# Patient Record
Sex: MALE | Race: WHITE | NOT HISPANIC OR LATINO | Employment: FULL TIME | URBAN - METROPOLITAN AREA
[De-identification: names, ages, dates, MRNs, and addresses within clinical notes are randomized per-mention and may not be internally consistent; named-entity substitution may affect disease eponyms.]

---

## 2017-05-15 ENCOUNTER — HOSPITAL ENCOUNTER (EMERGENCY)
Facility: HOSPITAL | Age: 42
Discharge: HOME | End: 2017-05-15
Attending: EMERGENCY MEDICINE

## 2017-05-15 VITALS
BODY MASS INDEX: 25.2 KG/M2 | HEART RATE: 66 BPM | SYSTOLIC BLOOD PRESSURE: 143 MMHG | OXYGEN SATURATION: 95 % | RESPIRATION RATE: 20 BRPM | TEMPERATURE: 98 F | HEIGHT: 71 IN | DIASTOLIC BLOOD PRESSURE: 78 MMHG | WEIGHT: 180 LBS

## 2017-05-15 DIAGNOSIS — K64.5 THROMBOSED HEMORRHOIDS: Primary | ICD-10-CM

## 2017-05-15 PROCEDURE — 25000003 PHARM REV CODE 250

## 2017-05-15 PROCEDURE — 25000003 PHARM REV CODE 250: Performed by: EMERGENCY MEDICINE

## 2017-05-15 PROCEDURE — 46320 REMOVAL OF HEMORRHOID CLOT: CPT

## 2017-05-15 PROCEDURE — 96372 THER/PROPH/DIAG INJ SC/IM: CPT

## 2017-05-15 PROCEDURE — 99283 EMERGENCY DEPT VISIT LOW MDM: CPT | Mod: 25

## 2017-05-15 PROCEDURE — 46083 INC THROMBOSED HROID XTRNL: CPT

## 2017-05-15 PROCEDURE — 63600175 PHARM REV CODE 636 W HCPCS: Performed by: EMERGENCY MEDICINE

## 2017-05-15 RX ORDER — HYDROMORPHONE HYDROCHLORIDE 1 MG/ML
1 INJECTION, SOLUTION INTRAMUSCULAR; INTRAVENOUS; SUBCUTANEOUS
Status: COMPLETED | OUTPATIENT
Start: 2017-05-15 | End: 2017-05-15

## 2017-05-15 RX ORDER — OXYCODONE AND ACETAMINOPHEN 5; 325 MG/1; MG/1
1 TABLET ORAL EVERY 4 HOURS PRN
Qty: 15 TABLET | Refills: 0 | Status: SHIPPED | OUTPATIENT
Start: 2017-05-15 | End: 2020-08-28 | Stop reason: CLARIF

## 2017-05-15 RX ORDER — ALPRAZOLAM 0.25 MG/1
0.5 TABLET ORAL
Status: COMPLETED | OUTPATIENT
Start: 2017-05-15 | End: 2017-05-15

## 2017-05-15 RX ORDER — DOCUSATE SODIUM 100 MG/1
100 CAPSULE, LIQUID FILLED ORAL 2 TIMES DAILY PRN
Qty: 30 CAPSULE | Refills: 0 | Status: SHIPPED | OUTPATIENT
Start: 2017-05-15 | End: 2020-08-28 | Stop reason: CLARIF

## 2017-05-15 RX ORDER — LIDOCAINE HYDROCHLORIDE AND EPINEPHRINE 10; 10 MG/ML; UG/ML
10 INJECTION, SOLUTION INFILTRATION; PERINEURAL
Status: DISCONTINUED | OUTPATIENT
Start: 2017-05-15 | End: 2017-05-16 | Stop reason: HOSPADM

## 2017-05-15 RX ADMIN — ALPRAZOLAM 0.5 MG: 0.25 TABLET ORAL at 09:05

## 2017-05-15 RX ADMIN — HYDROMORPHONE HYDROCHLORIDE 1 MG: 1 INJECTION, SOLUTION INTRAMUSCULAR; INTRAVENOUS; SUBCUTANEOUS at 09:05

## 2017-05-15 RX ADMIN — LIDOCAINE HYDROCHLORIDE: 10; .005 INJECTION, SOLUTION EPIDURAL; INFILTRATION; INTRACAUDAL; PERINEURAL at 09:05

## 2017-05-15 NOTE — ED AVS SNAPSHOT
[facility] [address] [place] [address]               Karthik Escobar   5/15/2017  8:54 PM   ED    Description:  Male : 1975   Department:  [facility]           Your Care was Coordinated By:     Provider Role From To    Yoshi Blank MD Attending Provider 05/15/17 9212 --      Reason for Visit     Hemorrhoids           Diagnoses this Visit        Comments    Thrombosed hemorrhoids    -  Primary       ED Disposition     None           To Do List           Follow-up Information     Follow up with [facility].    Specialty:  Emergency Medicine    Why:  As needed    Contact information:    [address] [place] [address] [id number] [phone]        Schedule an appointment as soon as possible for a visit with Jaguar Banda MD.    Specialties:  General Surgery, Colon and Rectal Surgery, Surgery    Contact information:     [facility]  [address]  [facility] [address]  [phone]         These Medications        Disp Refills Start End    docusate sodium (COLACE) 100 MG capsule 30 capsule 0 5/15/2017     Take 1 capsule (100 mg total) by mouth 2 (two) times daily as needed for Constipation. - Oral    oxycodone-acetaminophen (PERCOCET) 5-325 mg per tablet 15 tablet 0 5/15/2017     Take 1 tablet by mouth every 4 (four) hours as needed. - Oral      [facility]     [facility] Care Line - 24/7 Assistance  Unless otherwise directed by your provider, please contact [facility], our nurse care line that is available for 24/7 assistance.     Registered nurses in the [facility] provide: appointment scheduling, clinical advisement, health education, and other advisory services.  Call: [phone] (toll free)               Medications           Message regarding Medications     Verify the changes and/or additions to your medication regime listed below are the same as  discussed with your clinician today.  If any of these changes or additions are incorrect, please notify your healthcare provider.        START taking these NEW medications        Refills    docusate sodium (COLACE) 100 MG capsule 0    Sig: Take 1 capsule (100 mg total) by mouth 2 (two) times daily as needed for Constipation.    Class: Print    Route: Oral    oxycodone-acetaminophen (PERCOCET) 5-325 mg per tablet 0    Sig: Take 1 tablet by mouth every 4 (four) hours as needed.    Class: Print    Route: Oral      These medications were administered today        Dose Freq    lidocaine-EPINEPHrine 1%-1:100,000 injection 10 mL 10 mL ED 1 Time    Sig: Inject 10 mLs into the skin ED 1 Time.    Class: Normal    Route: Intradermal    HYDROmorphone injection 1 mg 1 mg ED 1 Time    Sig: Inject 1 mL (1 mg total) into the muscle ED 1 Time.    Class: Normal    Route: Intramuscular    lidocaine-EPINEPHrine (PF) 1%-1:200,000 1 %-1:200,000 injection      Notes to Pharmacy: Created by cabinet override    HYDROmorphone injection 1 mg 1 mg ED 1 Time    Sig: Inject 1 mL (1 mg total) into the muscle ED 1 Time.    Class: Normal    Route: Intramuscular    alprazolam tablet 0.5 mg 0.5 mg ED 1 Time    Sig: Take 2 tablets (0.5 mg total) by mouth ED 1 Time.    Class: Normal    Route: Oral           Verify that the below list of medications is an accurate representation of the medications you are currently taking.  If none reported, the list may be blank. If incorrect, please contact your healthcare provider. Carry this list with you in case of emergency.           Current Medications     docusate sodium (COLACE) 100 MG capsule Take 1 capsule (100 mg total) by mouth 2 (two) times daily as needed for Constipation.    lidocaine-EPINEPHrine (PF) 1%-1:200,000 1 %-1:200,000 injection     lidocaine-EPINEPHrine 1%-1:100,000 injection 10 mL Inject 10 mLs into the skin ED 1 Time.    oxycodone-acetaminophen (PERCOCET) 5-325 mg per tablet Take 1 tablet by  "mouth every 4 (four) hours as needed.           Clinical Reference Information           Your Vitals Were     BP Pulse Temp Resp Height Weight    143/78 (BP Location: Right arm, Patient Position: Sitting) 66 98.1 °F (36.7 °C) (Oral) 20 5' 11" (1.803 m) 81.6 kg (180 lb)    SpO2 BMI             95% 25.1 kg/m2         Allergies as of 5/15/2017     No Known Allergies      Immunizations Administered on Date of Encounter - 5/15/2017     None      ED Micro, Lab, POCT     None      ED Imaging Orders     None        Discharge Instructions         Thrombosed Hemorrhoids    Hemorrhoids are swollen veins in the lower rectum and anus. They're similar to varicose veins that form in the legs. Hemorrhoids can happen inside the rectum (internal hemorrhoids). Or one may form at the anal opening (external hemorrhoids). Although they may bleed, most hemorrhoids aren't cause for concern. But a small blood clot (thrombus) can develop in an external hemorrhoid. This may lead to severe pain and sometimes bleeding.  When to go to the emergency room (ER)  If you have severe pain or excessive bleeding, seek immediate medical care.  What to expect in the ER  A healthcare provider is likely to check your anus and rectum using a slender, lighted tube (anoscope or proctoscope). A local anesthetic is given to ease any discomfort.  Treatment  Treatment recommendations include the following:  · If the blood clot has formed within the past 48 to 72 hours, your healthcare provider may remove it from within the hemorrhoid. This is a simple procedure that can relieve pain. You will have a local anesthetic to keep you pain-free during the procedure. A small incision is made in the skin, and the blood clot is removed. Stitches are generally not needed.  · If more than 72 hours have passed, your healthcare provider will suggest home treatments. Simple home treatments can relieve your pain. These may include warm baths, ointments, suppositories, and witch " [place]aja compresses. Many thrombosed hemorrhoids go away on their own in a few weeks.  · If you have persistent bleeding or painful hemorrhoids, talk to your healthcare provider about possible treatment with banding, ligation, or removal (hemorrhoidectomy).  Tips for preventing hemorrhoids  Tips include the following:  · Eat foods that are high in fiber and use fiber supplements to help prevent constipation.  · Drink plenty of liquids.  · Get regular exercise to help prevent constipation and promote good bowel function.   Date Last Reviewed: 6/1/2016  © 8146-9191 [employer]. [address] [place] [address]. All rights reserved. This information is not intended as a substitute for professional medical care. Always follow your healthcare professional's instructions.          [facility] Sign-Up     Activating your [facility] account is as easy as 1-2-3!     1) Visit [email or web address], select Sign Up Now, enter this activation code and your date of birth, then select Next.  [email or web address]  Expires: 6/29/2017 10:20 PM      2) Create a username and password to use when you visit [facility] in the future and select a security question in case you lose your password and select Next.    3) Enter your e-mail address and click Sign Up!    Additional Information  If you have questions, please e-mail [email or web address] or call [phone] to talk to our [facility] staff. Remember, [facility] is NOT to be used for urgent needs. For medical emergencies, dial 911.          [facility] complies with applicable Federal civil rights laws and does not discriminate on the basis of race, color, national origin, age, disability, or sex.        Language Assistance Services     ATTENTION: Language assistance services are available, free of charge. Please call [phone].      ATENCIÓN: Si habla carlosañol, tiene a zuñiga disposición servicios gratuitos de asistencia lingüística. Llame al  [phone].     JEANA Ý: N?u b?n nói Ti?ng Vi?t, có các d?ch v? h? tr? ngôn ng? mi?n phí dành cho b?n. G?i s? [phone].

## 2017-05-16 NOTE — DISCHARGE INSTRUCTIONS
Thrombosed Hemorrhoids    Hemorrhoids are swollen veins in the lower rectum and anus. They're similar to varicose veins that form in the legs. Hemorrhoids can happen inside the rectum (internal hemorrhoids). Or one may form at the anal opening (external hemorrhoids). Although they may bleed, most hemorrhoids aren't cause for concern. But a small blood clot (thrombus) can develop in an external hemorrhoid. This may lead to severe pain and sometimes bleeding.  When to go to the emergency room (ER)  If you have severe pain or excessive bleeding, seek immediate medical care.  What to expect in the ER  A healthcare provider is likely to check your anus and rectum using a slender, lighted tube (anoscope or proctoscope). A local anesthetic is given to ease any discomfort.  Treatment  Treatment recommendations include the following:  · If the blood clot has formed within the past 48 to 72 hours, your healthcare provider may remove it from within the hemorrhoid. This is a simple procedure that can relieve pain. You will have a local anesthetic to keep you pain-free during the procedure. A small incision is made in the skin, and the blood clot is removed. Stitches are generally not needed.  · If more than 72 hours have passed, your healthcare provider will suggest home treatments. Simple home treatments can relieve your pain. These may include warm baths, ointments, suppositories, and witch hazel compresses. Many thrombosed hemorrhoids go away on their own in a few weeks.  · If you have persistent bleeding or painful hemorrhoids, talk to your healthcare provider about possible treatment with banding, ligation, or removal (hemorrhoidectomy).  Tips for preventing hemorrhoids  Tips include the following:  · Eat foods that are high in fiber and use fiber supplements to help prevent constipation.  · Drink plenty of liquids.  · Get regular exercise to help prevent constipation and promote good bowel function.   Date Last  Reviewed: 6/1/2016  © 1154-9548 [employer]. [address] [place] [address], [place] [address]. All rights reserved. This information is not intended as a substitute for professional medical care. Always follow your healthcare professional's instructions.

## 2021-06-22 ENCOUNTER — LAB VISIT (OUTPATIENT)
Dept: PRIMARY CARE CLINIC | Facility: OTHER | Age: 46
End: 2021-06-22

## 2021-06-22 DIAGNOSIS — Z20.822 ENCOUNTER FOR LABORATORY TESTING FOR COVID-19 VIRUS: ICD-10-CM

## 2021-06-22 PROCEDURE — U0003 INFECTIOUS AGENT DETECTION BY NUCLEIC ACID (DNA OR RNA); SEVERE ACUTE RESPIRATORY SYNDROME CORONAVIRUS 2 (SARS-COV-2) (CORONAVIRUS DISEASE [COVID-19]), AMPLIFIED PROBE TECHNIQUE, MAKING USE OF HIGH THROUGHPUT TECHNOLOGIES AS DESCRIBED BY CMS-2020-01-R: HCPCS | Performed by: INTERNAL MEDICINE

## 2021-06-23 LAB — SARS-COV-2 RNA RESP QL NAA+PROBE: NOT DETECTED

## 2022-02-09 ENCOUNTER — HOSPITAL ENCOUNTER (EMERGENCY)
Facility: HOSPITAL | Age: 47
Discharge: HOME | End: 2022-02-09
Attending: EMERGENCY MEDICINE
Payer: MEDICAID

## 2022-02-09 VITALS
BODY MASS INDEX: 27.09 KG/M2 | HEIGHT: 72 IN | DIASTOLIC BLOOD PRESSURE: 82 MMHG | HEART RATE: 74 BPM | SYSTOLIC BLOOD PRESSURE: 137 MMHG | RESPIRATION RATE: 18 BRPM | TEMPERATURE: 98 F | WEIGHT: 200 LBS | OXYGEN SATURATION: 96 %

## 2022-02-09 DIAGNOSIS — R52 PAIN: Primary | ICD-10-CM

## 2022-02-09 DIAGNOSIS — S86.812A STRAIN OF CALF MUSCLE, LEFT, INITIAL ENCOUNTER: ICD-10-CM

## 2022-02-09 PROCEDURE — 25000003 PHARM REV CODE 250: Performed by: EMERGENCY MEDICINE

## 2022-02-09 PROCEDURE — 99283 EMERGENCY DEPT VISIT LOW MDM: CPT

## 2022-02-09 RX ORDER — HYDROCODONE BITARTRATE AND ACETAMINOPHEN 5; 325 MG/1; MG/1
1 TABLET ORAL EVERY 4 HOURS PRN
Qty: 10 TABLET | Refills: 0 | Status: SHIPPED | OUTPATIENT
Start: 2022-02-09

## 2022-02-09 RX ORDER — HYDROCODONE BITARTRATE AND ACETAMINOPHEN 5; 325 MG/1; MG/1
1 TABLET ORAL
Status: COMPLETED | OUTPATIENT
Start: 2022-02-09 | End: 2022-02-09

## 2022-02-09 RX ADMIN — HYDROCODONE BITARTRATE AND ACETAMINOPHEN 1 TABLET: 5; 325 TABLET ORAL at 11:02

## 2022-02-09 NOTE — ED PROVIDER NOTES
Encounter Date: 2/9/2022       History     Chief Complaint   Patient presents with    Fall     Fell off a ladder today injuring his L lower leg     45 yo male reports to the ED today c/o of 8/10 left lower leg pain after he had a 3-4ft fall off the ladder around 10am this morning. His pain is worsened with walking or foot movement. He explains he was cleaning the gutters when he lost his footing on the ladder and his leg got caught on the rungs. He came into ED by wheelchair this morning.. He denies hitting his head or any LOC. He denies any swelling, discoloration, nausea, vomiting, diarrhea, numbness, tingling.    The history is provided by the patient and medical records.     Review of patient's allergies indicates:  No Known Allergies  No past medical history on file.  Past Surgical History:   Procedure Laterality Date    JOINT REPLACEMENT      knee    MANDIBLE FRACTURE SURGERY       No family history on file.  Social History     Tobacco Use    Smoking status: Never Smoker   Substance Use Topics    Alcohol use: Yes    Drug use: No     Review of Systems   Constitutional: Negative.    HENT: Negative.    Eyes: Negative.    Respiratory: Negative.  Negative for shortness of breath.    Cardiovascular: Negative.  Negative for chest pain.   Gastrointestinal: Negative for abdominal pain, nausea and vomiting.   Endocrine: Negative.    Genitourinary: Negative.    Musculoskeletal: Negative for back pain and myalgias.        Tenderness to the left lower extremity to the proximal fibular area no tenderness over the tibial plateau area   Skin: Negative for color change, pallor and wound.   Neurological: Negative for weakness and numbness.   Psychiatric/Behavioral: Negative.    All other systems reviewed and are negative.      Physical Exam     Initial Vitals [02/09/22 1014]   BP Pulse Resp Temp SpO2   137/82 74 18 98.2 °F (36.8 °C) 96 %      MAP       --         Physical Exam    Nursing note and vitals  reviewed.  Constitutional: He appears well-developed and well-nourished.   HENT:   Head: Normocephalic and atraumatic.   Eyes: Pupils are equal, round, and reactive to light.   Erythema of left conjunctiva    Cardiovascular: Normal rate, regular rhythm and normal heart sounds.   Pulmonary/Chest: Breath sounds normal.   Musculoskeletal:         General: Tenderness present.      Right lower leg: No swelling.      Left lower leg: Tenderness present. No swelling, deformity, lacerations or bony tenderness.        Legs:       Comments: 2+ DP/PT pulses bilaterally  pain with dorsiflexion and plantarflexion in LLE  Negative Homans sign  Pain to left superior lateral calf with distal compression of ankle  Negative thompsons test      Neurological: He is alert and oriented to person, place, and time.   Skin: Skin is warm and dry. Capillary refill takes less than 2 seconds.         ED Course   Procedures  Labs Reviewed - No data to display       Imaging Results          X-Ray Knee Complete 4 or more Views Left (Final result)  Result time 02/09/22 11:49:23   Procedure changed from X-Ray Knee 3 View Left     Final result by Lin Campo MD (02/09/22 11:49:23)                 Narrative:    Left knee 4 views    Clinical data: Pain after fall    FINDINGS: 4 views are negative for fracture, dislocation, or osseous destructive lesion. No significant arthritic change or joint effusion is identified.    IMPRESSION:    1. Normal left knee.    Electronically signed by:  Lin Campo MD  2/9/2022 11:49 AM CST Workstation: [id number]                             X-Ray Tibia Fibula 2 View Left (Final result)  Result time 02/09/22 11:49:42    Final result by Lin Campo MD (02/09/22 11:49:42)                 Narrative:    Reason: Pain    Findings:  Two views left tibia and fibula show no fracture, dislocation, or destructive osseous lesion. Soft tissues are unremarkable.    Impression:  Negative exam.    Electronically  signed by:  Lin Campo MD  2/9/2022 11:49 AM [facility] Workstation: [id number]                               Medications   HYDROcodone-acetaminophen 5-325 mg per tablet 1 tablet (1 tablet Oral Given 2/9/22 5802)     Medical Decision Making:   Initial Assessment:   47 yo male reports to the ED today c/o of 8/10 left lower leg pain after he had a 3-4ft fall off the ladder around 10am this morning. His pain is worsened with walking or foot movement. He explains he was cleaning the gutters when he lost his footing on the ladder and his leg got caught on the rungs. He came into ED by wheelchair this morning. He endorses weakness in his leg due to pain. He denies hitting his head or any LOC. He denies any swelling, discoloration, nausea, vomiting, diarrhea, numbness, tingling. He denies any personal or family history of clots.  Differential Diagnosis:   Differentials include compartment syndrome, DVT, fracture, sprain, strain  ED Management:  47 yo male with left lower leg pain after a 3-4ft fall off a ladder this morning when is leg awkwardly caught on a ladder rung. He locates his pain to left superior lateral calf just below the fibular head. There was no deformity, discoloration, crepitius, or bony tenderness. Negative Adryan's sign. Negative edgar test. No warmth or erythema to the lower extremity. Pt has pain and weakness with dorsiflexion, plantarflexion, heel-walking, and toe-walking. He has pain with ambulation. X-rays were negative for fracture. His exam and results are most consistent with muscle strain. Pt instructed to follow up with orthopedist if he continues to have weakness and pain with ambulation. Pt given detailed instructions on when to return to ED                      Clinical Impression:   Final diagnoses:  [R52] Pain (Primary)  [S86.192A] Strain of calf muscle, left, initial encounter          ED Disposition Condition    Discharge Stable        ED Prescriptions     Medication Sig Dispense Start  Date End Date Auth. Provider    HYDROcodone-acetaminophen (NORCO) 5-325 mg per tablet Take 1 tablet by mouth every 4 (four) hours as needed for Pain. 10 tablet 2/9/2022  MUSTAPHA Barcenas        Follow-up Information     Follow up With Specialties Details Why Contact Info    Kareem Choudhary MD Orthopedic Surgery In 1 week If symptoms worsen [address] [facility]  [facility] [address]  [phone]             MUSTAPHA Barcenas  02/09/22 2022

## 2022-02-09 NOTE — DISCHARGE INSTRUCTIONS
Use crutches as needed for ambulation.   Take Norco as directed for pain. Continue to ice and rest to improve recovery  Follow up with Orthopedics if your symptoms persist   Return to the ED if you develop redness, warmth, leg swelling, or worsening pain.

## 2022-11-03 ENCOUNTER — OCCUPATIONAL HEALTH (OUTPATIENT)
Dept: URGENT CARE | Facility: CLINIC | Age: 47
End: 2022-11-03
Payer: MEDICAID

## 2022-11-03 DIAGNOSIS — Z02.83 ENCOUNTER FOR DRUG SCREENING: Primary | ICD-10-CM

## 2022-11-03 LAB
CTP QC/QA: YES
POC 5 PANEL DRUG SCREEN: NEGATIVE

## 2022-11-03 PROCEDURE — 80305 DRUG TEST PRSMV DIR OPT OBS: CPT | Mod: ,,, | Performed by: PHYSICIAN ASSISTANT

## 2022-11-03 PROCEDURE — 80305 POCT RAPID DRUG SCREEN 5 PANEL: ICD-10-PCS | Mod: ,,, | Performed by: PHYSICIAN ASSISTANT

## 2024-08-19 ENCOUNTER — HOSPITAL ENCOUNTER (EMERGENCY)
Facility: HOSPITAL | Age: 49
Discharge: HOME | End: 2024-08-19
Attending: EMERGENCY MEDICINE
Payer: MEDICAID

## 2024-08-19 VITALS
SYSTOLIC BLOOD PRESSURE: 160 MMHG | WEIGHT: 190 LBS | TEMPERATURE: 98 F | BODY MASS INDEX: 25.73 KG/M2 | HEART RATE: 62 BPM | RESPIRATION RATE: 18 BRPM | HEIGHT: 72 IN | OXYGEN SATURATION: 96 % | DIASTOLIC BLOOD PRESSURE: 88 MMHG

## 2024-08-19 DIAGNOSIS — R09.A2 FOREIGN BODY SENSATION IN THROAT: Primary | ICD-10-CM

## 2024-08-19 LAB
ALBUMIN SERPL BCP-MCNC: 4.6 G/DL (ref 3.5–5.2)
ALP SERPL-CCNC: 36 U/L (ref 55–135)
ALT SERPL W/O P-5'-P-CCNC: 19 U/L (ref 10–44)
ANION GAP SERPL CALC-SCNC: 6 MMOL/L (ref 8–16)
AST SERPL-CCNC: 18 U/L (ref 10–40)
BASOPHILS # BLD AUTO: 0.03 K/UL (ref 0–0.2)
BASOPHILS NFR BLD: 0.6 % (ref 0–1.9)
BILIRUB SERPL-MCNC: 0.5 MG/DL (ref 0.1–1)
BUN SERPL-MCNC: 18 MG/DL (ref 6–20)
CALCIUM SERPL-MCNC: 10.7 MG/DL (ref 8.7–10.5)
CHLORIDE SERPL-SCNC: 104 MMOL/L (ref 95–110)
CO2 SERPL-SCNC: 26 MMOL/L (ref 23–29)
CREAT SERPL-MCNC: 1.1 MG/DL (ref 0.5–1.4)
DIFFERENTIAL METHOD BLD: ABNORMAL
EOSINOPHIL # BLD AUTO: 0.1 K/UL (ref 0–0.5)
EOSINOPHIL NFR BLD: 1.3 % (ref 0–8)
ERYTHROCYTE [DISTWIDTH] IN BLOOD BY AUTOMATED COUNT: 14.9 % (ref 11.5–14.5)
EST. GFR  (NO RACE VARIABLE): >60 ML/MIN/1.73 M^2
GLUCOSE SERPL-MCNC: 109 MG/DL (ref 70–110)
HCT VFR BLD AUTO: 40.3 % (ref 40–54)
HGB BLD-MCNC: 13.1 G/DL (ref 14–18)
IMM GRANULOCYTES # BLD AUTO: 0.02 K/UL (ref 0–0.04)
IMM GRANULOCYTES NFR BLD AUTO: 0.4 % (ref 0–0.5)
LYMPHOCYTES # BLD AUTO: 1.1 K/UL (ref 1–4.8)
LYMPHOCYTES NFR BLD: 21.7 % (ref 18–48)
MAGNESIUM SERPL-MCNC: 2 MG/DL (ref 1.6–2.6)
MCH RBC QN AUTO: 26.9 PG (ref 27–31)
MCHC RBC AUTO-ENTMCNC: 32.5 G/DL (ref 32–36)
MCV RBC AUTO: 83 FL (ref 82–98)
MONOCYTES # BLD AUTO: 0.4 K/UL (ref 0.3–1)
MONOCYTES NFR BLD: 8.1 % (ref 4–15)
NEUTROPHILS # BLD AUTO: 3.5 K/UL (ref 1.8–7.7)
NEUTROPHILS NFR BLD: 67.9 % (ref 38–73)
NRBC BLD-RTO: 0 /100 WBC
PLATELET # BLD AUTO: 308 K/UL (ref 150–450)
PMV BLD AUTO: 8 FL (ref 9.2–12.9)
POTASSIUM SERPL-SCNC: 4.4 MMOL/L (ref 3.5–5.1)
PROT SERPL-MCNC: 7.8 G/DL (ref 6–8.4)
RBC # BLD AUTO: 4.87 M/UL (ref 4.6–6.2)
SODIUM SERPL-SCNC: 136 MMOL/L (ref 136–145)
WBC # BLD AUTO: 5.2 K/UL (ref 3.9–12.7)

## 2024-08-19 PROCEDURE — 99284 EMERGENCY DEPT VISIT MOD MDM: CPT | Mod: 25

## 2024-08-19 PROCEDURE — 80053 COMPREHEN METABOLIC PANEL: CPT | Performed by: EMERGENCY MEDICINE

## 2024-08-19 PROCEDURE — 85025 COMPLETE CBC W/AUTO DIFF WBC: CPT | Performed by: EMERGENCY MEDICINE

## 2024-08-19 PROCEDURE — 83735 ASSAY OF MAGNESIUM: CPT | Performed by: EMERGENCY MEDICINE

## 2024-08-19 RX ORDER — PANTOPRAZOLE SODIUM 40 MG/1
40 TABLET, DELAYED RELEASE ORAL DAILY
Qty: 30 TABLET | Refills: 0 | Status: SHIPPED | OUTPATIENT
Start: 2024-08-19 | End: 2025-08-19

## 2024-08-19 NOTE — ED PROVIDER NOTES
Encounter Date: 8/19/2024       History     Chief Complaint   Patient presents with    Foreign Body In Throat     Patient feels like he has something in his throat x 6 months - hx of broken jaw fixed with plates - states he needs to find out what is in his throat and states he feels like he is not using his entire left lung due to blockage and his left ear is closed.     Patient presents emergency department with reported foreign body sensation in his throat states feels his something stuck in the left side of his throat like there is a mass there the prevents him from breathing states symptoms are there constantly he reports associated left ear fullness feeling decreased hearing in the left ear as well as left sinus congestion he reports antecedent injury patient reports his history of mandible fracture with ORIF of the mandible states symptoms have been present since that time but over last few months he has been having difficulty with the foreign body sensation in his throat no adjuvant imaging has been performed since that time        Review of patient's allergies indicates:  No Known Allergies  No past medical history on file.  Past Surgical History:   Procedure Laterality Date    JOINT REPLACEMENT      knee    MANDIBLE FRACTURE SURGERY       No family history on file.  Social History     Tobacco Use    Smoking status: Never   Substance Use Topics    Alcohol use: Yes    Drug use: No     Review of Systems   Constitutional:  Negative for chills and fatigue.   HENT:  Positive for congestion and hearing loss. Negative for sore throat and trouble swallowing.    Respiratory:  Positive for cough.    Cardiovascular:  Negative for chest pain.   Gastrointestinal:  Negative for nausea.   All other systems reviewed and are negative.      Physical Exam     Initial Vitals   BP Pulse Resp Temp SpO2   08/19/24 0810 08/19/24 0810 08/19/24 0810 08/19/24 0816 08/19/24 0810   (!) 160/88 71 18 97.5 °F (36.4 °C) 98 %      MAP        --                Physical Exam    Constitutional: He appears well-developed and well-nourished. No distress.   HENT:   Head: Normocephalic and atraumatic.   Right Ear: External ear normal.   Left Ear: External ear normal.   Mouth/Throat: Oropharynx is clear and moist.   Eyes: Pupils are equal, round, and reactive to light.   Neck: Neck supple. No tracheal deviation present.   Normal range of motion.  Cardiovascular:  Normal rate, regular rhythm, S1 normal, S2 normal, normal heart sounds and intact distal pulses.           Pulmonary/Chest: Breath sounds normal. No stridor. No respiratory distress.   Abdominal: Abdomen is soft. Bowel sounds are normal. There is no abdominal tenderness.   Musculoskeletal:         General: Normal range of motion.      Cervical back: Normal range of motion and neck supple.     Lymphadenopathy:     He has no cervical adenopathy.   Neurological: He is alert and oriented to person, place, and time. GCS score is 15. GCS eye subscore is 4. GCS verbal subscore is 5. GCS motor subscore is 6.   Skin: Skin is warm and dry. Capillary refill takes less than 2 seconds. No rash noted.   Psychiatric: He has a normal mood and affect. His behavior is normal.         ED Course   Procedures  Labs Reviewed   CBC W/ AUTO DIFFERENTIAL - Abnormal       Result Value    WBC 5.20      RBC 4.87      Hemoglobin 13.1 (*)     Hematocrit 40.3      MCV 83      MCH 26.9 (*)     MCHC 32.5      RDW 14.9 (*)     Platelets 308      MPV 8.0 (*)     Immature Granulocytes 0.4      Gran # (ANC) 3.5      Immature Grans (Abs) 0.02      Lymph # 1.1      Mono # 0.4      Eos # 0.1      Baso # 0.03      nRBC 0      Gran % 67.9      Lymph % 21.7      Mono % 8.1      Eosinophil % 1.3      Basophil % 0.6      Differential Method Automated     COMPREHENSIVE METABOLIC PANEL - Abnormal    Sodium 136      Potassium 4.4      Chloride 104      CO2 26      Glucose 109      BUN 18      Creatinine 1.1      Calcium 10.7 (*)     Total Protein  7.8      Albumin 4.6      Total Bilirubin 0.5      Alkaline Phosphatase 36 (*)     AST 18      ALT 19      eGFR >60.0      Anion Gap 6 (*)    MAGNESIUM    Magnesium 2.0            Imaging Results              CT Soft Tissue Neck WO Contrast (Final result)  Result time 08/19/24 09:18:35      Final result by Kristopher Basilio MD (08/19/24 09:18:35)                   Impression:      No foreign body identified.    No CT evidence of tonsillitis.      Electronically signed by: Kristopher Basilio  Date:    08/19/2024  Time:    09:18               Narrative:    EXAMINATION:  CT SOFT TISSUE NECK WITHOUT CONTRAST    CLINICAL HISTORY:  Tonsil/adenoid disorder;    TECHNIQUE:  Axial imaging obtained through the neck with coronal and sagittal reformatted images.  No IV contrast.    CMS Mandated Quality Data-CT Radiation Dose-436    All CT scans at this facility dose modulation, iterative reconstruction, and or weight-based dosing when appropriate to reduce radiation dose to as low as reasonably achievable.    COMPARISON:  None    FINDINGS:  Limited CT imaging through the base of the skull shows no acute intracranial pathology.  Left-sided mastoid air cells are clear.  Small right mastoid effusion.  Visualized paranasal sinuses are clear.    Bone window images show minor degenerative changes of the spine.  Non-united C7 spinous process fracture.    Parapharyngeal fat planes are maintained.  Left parotid gland is somewhat atrophic relative to the right.  No focal parotid gland lesion.  No submandibular gland lesion.  Normal appearance of the thyroid gland.    Normal size cervical lymph nodes bilaterally.  Negative for grossly enlarged adenoid or palatine tonsils.    No mass or foreign body involving the nasopharynx, oropharynx, or hypopharynx.    Limited imaging through the lung apices shows no acute pulmonary pathology.                                       CT Maxillofacial Without Contrast (Edited Result - FINAL)  Result time  "08/19/24 09:29:55      Addendum (preliminary) 1 of 1 by Mark Ambriz MD (08/19/24 09:29:55)      IMPRESSION should read " No acute maxillofacial fracture or other acute abnormality."      Electronically signed by: Mark Ambriz  Date:    08/19/2024  Time:    09:29                 Final result by Mark Ambriz MD (08/19/24 09:14:09)                   Impression:      Acute maxillofacial fracture or other acute abnormality.      Electronically signed by: Mark Ambriz  Date:    08/19/2024  Time:    09:14               Narrative:    EXAMINATION:  CT MAXILLOFACIAL WITHOUT CONTRAST    CLINICAL HISTORY:  Facial fracture, follow up;    TECHNIQUE:  Thin axial imaging through the maxillofacial bones was performed without contrast, with sagittal and coronal reformatted images reviewed.    FINDINGS:  Comparison to prior exams.  There are postoperative changes of ORIF of remote healed mandibular fractures, with metallic plate and screws involving the left mandibular ramus, and the right mandibular body and mental protuberance.  Hardware alignment is unchanged, with no findings of hardware fracture or loosening.    There are no acute maxillofacial fractures. The paranasal sinuses are well-aerated, with no sinus air-fluid levels.  There is rightward bony nasal septal deviation. The optic globes, orbits and facial soft tissues are unremarkable.  There is scattered right mastoid air cell fluid.                                       Medications - No data to display  Medical Decision Making  Laboratory evaluation reviewed CT scan shows no evidence of mass or obstructive process no evidence of sinusitis hardware is intact advised patient these findings wife is inquisitive with a whether not this could be secondary to reflux symptoms states he drank some Coke and it felt better after the Coke will try Protonix as an outpatient recommend outpatient follow-up with Dr. Barrios return to the emergency department for any worsened " symptoms or new symptoms    Amount and/or Complexity of Data Reviewed  Labs: ordered. Decision-making details documented in ED Course.  Radiology: ordered. Decision-making details documented in ED Course.                                      Clinical Impression:  Final diagnoses:  [R09.A2] Foreign body sensation in throat (Primary)          ED Disposition Condition    Discharge Stable          ED Prescriptions       Medication Sig Dispense Start Date End Date Auth. Provider    pantoprazole (PROTONIX) 40 MG tablet Take 1 tablet (40 mg total) by mouth once daily. 30 tablet 8/19/2024 8/19/2025 Travis Teixeira MD          Follow-up Information       Follow up With Specialties Details Why Contact Info    William De La Cruz MD Otolaryngology Call in 1 day for re-examination of your symptoms [address] [facility]  [facility] [id number]  [phone]               Travis Teixeira MD  08/19/24 4369

## 2024-09-23 ENCOUNTER — HOSPITAL ENCOUNTER (EMERGENCY)
Facility: HOSPITAL | Age: 49
Discharge: HOME | End: 2024-09-23
Attending: STUDENT IN AN ORGANIZED HEALTH CARE EDUCATION/TRAINING PROGRAM
Payer: MEDICAID

## 2024-09-23 VITALS
HEART RATE: 85 BPM | TEMPERATURE: 98 F | BODY MASS INDEX: 26.6 KG/M2 | DIASTOLIC BLOOD PRESSURE: 94 MMHG | OXYGEN SATURATION: 98 % | HEIGHT: 71 IN | SYSTOLIC BLOOD PRESSURE: 163 MMHG | RESPIRATION RATE: 16 BRPM | WEIGHT: 190 LBS

## 2024-09-23 DIAGNOSIS — S49.90XA SHOULDER INJURY: ICD-10-CM

## 2024-09-23 DIAGNOSIS — M24.411 RECURRENT SHOULDER DISLOCATION, RIGHT: Primary | ICD-10-CM

## 2024-09-23 PROCEDURE — 99283 EMERGENCY DEPT VISIT LOW MDM: CPT | Mod: 25

## 2024-09-23 RX ORDER — IBUPROFEN 400 MG/1
800 TABLET ORAL
Status: DISCONTINUED | OUTPATIENT
Start: 2024-09-23 | End: 2024-09-23 | Stop reason: HOSPADM

## 2024-09-23 NOTE — DISCHARGE INSTRUCTIONS
Please return to the emergency department if you have new or worsening symptoms.  Follow up with Orthopedic surgery for re-evaluation.

## 2024-09-23 NOTE — FIRST PROVIDER EVALUATION
Emergency Department TeleTriage Encounter Note      CHIEF COMPLAINT    Chief Complaint   Patient presents with    Shoulder Injury     RT, STATES TRIPPED OVER LADDER SAT, STATES SHOULDER PULLED OUT OF SOCKET, BUT PUT BACK IN. TODAY NOT SURE WHAT HAPPENED, BUT THINKS ITS OUT AGAIN       VITAL SIGNS   Initial Vitals [09/23/24 1503]   BP Pulse Resp Temp SpO2   (!) 163/94 85 16 98.3 °F (36.8 °C) 98 %      MAP       --            ALLERGIES    Review of patient's allergies indicates:  No Known Allergies    PROVIDER TRIAGE NOTE  This is a teletriage evaluation of a 49 y.o. male presenting to the ED complaining of shoulder injury. Patient reports dislocated shoulder 2 days ago after trip and fall but was able to reduce it at home. Patient states as he was picking up a ladder today he feels like it is dislocated again. He did not take any medications PTA.    Patient is alert and oriented. He speaks in complete sentences. He is sitting upright in the chair in no distress.     Initial orders will be placed and care will be transferred to an alternate provider when patient is roomed for a full evaluation. Any additional orders and the final disposition will be determined by that provider.         ORDERS  Labs Reviewed - No data to display    ED Orders (720h ago, onward)      Start Ordered     Status Ordering Provider    09/23/24 1515 09/23/24 1513  ibuprofen tablet 800 mg  ED 1 Time         Ordered STEFFANY CORRALES    09/23/24 1513 09/23/24 1512  X-Ray Shoulder Trauma Right  1 time imaging         Ordered STEFFANY CORRALES    09/23/24 1513 09/23/24 1512  Apply ice to affected area  Once         Ordered STEFFANY CORRALES              Virtual Visit Note: The provider triage portion of this emergency department evaluation and documentation was performed via [employer], a HIPAA-compliant telemedicine application, in concert with a tele-presenter in the room. A face to face patient evaluation with one of my colleagues will occur once  the patient is placed in an emergency department room.      DISCLAIMER: This note was prepared with [employer] voice recognition transcription software. Garbled syntax, mangled pronouns, and other bizarre constructions may be attributed to that software system.

## 2024-09-23 NOTE — ED PROVIDER NOTES
Encounter Date: 9/23/2024       History     Chief Complaint   Patient presents with    Shoulder Injury     RT, STATES TRIPPED OVER LADDER SAT, STATES SHOULDER PULLED OUT OF SOCKET, BUT PUT BACK IN. TODAY NOT SURE WHAT HAPPENED, BUT THINKS ITS OUT AGAIN     HPI    Karthik Escobar is a 49 y.o. male with a past medical history of recurrent shoulder dislocations that presents emergency department for evaluation of right shoulder pain.  Patient had a trip and fall 2 days ago in which he landed on an outstretched arm.  He felt like his shoulder dislocated at that time but he was able to pop it back in.  Feels like it is continuing to cause him difficulty and he is no longer able to raise his right arm above shoulder height.  No numbness or deformities noted.  No elbow or wrist pain.    Review of patient's allergies indicates:  No Known Allergies  No past medical history on file.  Past Surgical History:   Procedure Laterality Date    JOINT REPLACEMENT      knee    MANDIBLE FRACTURE SURGERY       No family history on file.  Social History     Tobacco Use    Smoking status: Never   Substance Use Topics    Alcohol use: Yes    Drug use: No     Review of Systems   Musculoskeletal:  Negative for back pain and neck pain.        Right shoulder pain   Neurological:  Negative for weakness and numbness.   All other systems reviewed and are negative.      Physical Exam     Initial Vitals [09/23/24 1503]   BP Pulse Resp Temp SpO2   (!) 163/94 85 16 98.3 °F (36.8 °C) 98 %      MAP       --         Physical Exam    Nursing note and vitals reviewed.  Constitutional: He appears well-developed and well-nourished.   HENT:   Head: Normocephalic and atraumatic.   Eyes: EOM are normal.   Neck:   Normal range of motion.  Cardiovascular:  Normal rate.           Pulmonary/Chest: No respiratory distress.   Abdominal: Abdomen is soft. He exhibits no distension.   Musculoskeletal:         General: Normal range of motion.      Cervical back:  Normal range of motion.      Comments: Mild tenderness to palpation along the right shoulder.  Pain is elicited when abducting the shoulder above 90°.  2+ distal pulse.  Normal sensation.  No wrist drop.  Strength of the elbow was intact in both flexion and extension.  Is able to internally and externally rotate the shoulder.     Neurological: He is alert and oriented to person, place, and time. He has normal strength. No cranial nerve deficit or sensory deficit.   Skin: Capillary refill takes less than 2 seconds.   Psychiatric: He has a normal mood and affect.         ED Course   Procedures  Labs Reviewed - No data to display       Imaging Results              X-Ray Shoulder Trauma Right (Final result)  Result time 09/23/24 15:49:54      Final result by Adam Carey DO (09/23/24 15:49:54)                   Impression:      No acute osseous abnormality.      Electronically signed by: Adam Carey  Date:    09/23/2024  Time:    15:49               Narrative:    EXAMINATION:  XR SHOULDER TRAUMA 3 VIEW RIGHT    CLINICAL HISTORY:  Unspecified injury of shoulder and upper arm, unspecified arm, initial encounter    FINDINGS:  Three views of the right shoulder show no fracture, dislocation, or destructive osseous lesion. The glenohumeral joint and AC joint are within normal limits.  No gross soft tissue abnormalities.                                       Medications   ibuprofen tablet 800 mg (has no administration in time range)     Medical Decision Making  Karthik Escobar is a 49 y.o. male with a past medical history of recurrent shoulder dislocations that presents emergency department for evaluation of right shoulder pain.  Concern for dislocation.  Vitals stable.  Exam with right shoulder that has reasonable range of motion and does not appear to be dislocated.  Pain elicited with abduction greater than 90°.  Neurovascularly intact distally.  X-rays show no acute process.  Likely has some underlying soft  tissue injury.  Placed in a sling and referred to Orthopedic surgery.  Return precautions given.                                      Clinical Impression:  Final diagnoses:  [S49.90XA] Shoulder injury                 Kaleb Rico MD  09/23/24 0577